# Patient Record
Sex: MALE | Race: WHITE | NOT HISPANIC OR LATINO | Employment: FULL TIME | URBAN - METROPOLITAN AREA
[De-identification: names, ages, dates, MRNs, and addresses within clinical notes are randomized per-mention and may not be internally consistent; named-entity substitution may affect disease eponyms.]

---

## 2020-04-03 ENCOUNTER — HOSPITAL ENCOUNTER (EMERGENCY)
Facility: HOSPITAL | Age: 40
Discharge: HOME | End: 2020-04-04

## 2020-04-03 DIAGNOSIS — M25.579 ANKLE PAIN: ICD-10-CM

## 2020-04-03 DIAGNOSIS — M25.569 KNEE PAIN: ICD-10-CM

## 2020-04-03 DIAGNOSIS — M79.606 LEG PAIN: ICD-10-CM

## 2020-04-03 PROCEDURE — 99284 EMERGENCY DEPT VISIT MOD MDM: CPT | Mod: 25

## 2020-04-04 VITALS
SYSTOLIC BLOOD PRESSURE: 133 MMHG | HEART RATE: 91 BPM | TEMPERATURE: 98 F | RESPIRATION RATE: 16 BRPM | BODY MASS INDEX: 27.56 KG/M2 | HEIGHT: 67 IN | WEIGHT: 175.63 LBS | OXYGEN SATURATION: 100 % | DIASTOLIC BLOOD PRESSURE: 74 MMHG

## 2020-04-04 NOTE — ED PROVIDER NOTES
SCRIBE #1 NOTE: I, Abeba Stallings, am scribing for, and in the presence of, Nando Hernández NP. I have scribed the entire note.       History     Chief Complaint   Patient presents with    Leg Pain     scrapped R leg on PVC pipe at work about 2 weeks ago; swelling, pain and redness to R leg     Review of patient's allergies indicates:  No Known Allergies      History of Present Illness     HPI    4/3/2020, 10:42 PM  History obtained from the patient      History of Present Illness: Jose Carlos Burton III is a 39 y.o. male patient who presents to the Emergency Department for evaluation of R leg pain which onset gradually 2 weeks PTA. Pt reports that 2 weeks PTA he scraped his R leg on a PVC pipe at work and his R knee has been hurting ever since. Symptoms are constant and moderate in severity. No mitigating or exacerbating factors reported. Pt noticed some R ankle swelling and pain 1 day PTA. Pt also c/o intermittent R calf pain. Patient denies any back pain, neck pain, fever, cough, congestion and all other sxs at this time. No prior Tx. No further complaints or concerns at this time.       Arrival mode: EMS      PCP: Primary Doctor No        Past Medical History:  History reviewed. No pertinent past medical history.       Past Surgical History:  History reviewed. No pertinent past surgical history.         Family History:  History reviewed. No pertinent family history.       Social History:  Social History     Tobacco Use    Smoking status: unknown   Substance and Sexual Activity    Alcohol use: unknown    Drug use: unknown    Sexual activity: unknown        Review of Systems     Review of Systems   Constitutional: Negative for fever.   HENT: Negative for congestion and sore throat.    Respiratory: Negative for cough and shortness of breath.    Cardiovascular: Negative for chest pain.   Gastrointestinal: Negative for nausea.   Genitourinary: Negative for dysuria.   Musculoskeletal: Negative for back pain and  neck pain.        (+) R leg pain  (+) R knee pain   (+) R ankle pain  (+) R calf pain   Skin: Negative for rash.   Neurological: Negative for weakness.   Hematological: Does not bruise/bleed easily.   All other systems reviewed and are negative.     Physical Exam     Initial Vitals [04/03/20 2240]   BP Pulse Resp Temp SpO2   (!) 148/80 97 20 98 °F (36.7 °C) 100 %      MAP       --          Physical Exam  Nursing Notes and Vital Signs Reviewed.  Constitutional: Patient is in no acute distress. Well-developed and well-nourished.  Head: Atraumatic. Normocephalic.  Eyes: PERRL. EOM intact. Conjunctivae are not pale. No scleral icterus.  ENT: Mucous membranes are moist. Oropharynx is clear and symmetric.    Neck: Supple. Full ROM. No lymphadenopathy.  Cardiovascular: Regular rate. Regular rhythm. No murmurs, rubs, or gallops. Distal pulses are 2+ and symmetric.  Pulmonary/Chest: No respiratory distress. Clear to auscultation bilaterally. No wheezing or rales.  Abdominal: Soft and non-distended.  There is no tenderness.  No rebound, guarding, or rigidity. Good bowel sounds.  Genitourinary: No CVA tenderness  Musculoskeletal: Moves all extremities. No obvious deformities. No edema. No calf tenderness.  RLE:  Mild swelling to medial R ankle. Calf and anterior knee tenderness to palpation. ROM is normal. Cap refill distally is <2 seconds. DP and PT pulses are equal and 2+ bilaterally. No motor deficit. No distal sensory deficit. RLE is neurovascularly intact.  Skin: Warm and dry.  Neurological:  Alert, awake, and appropriate.  Normal speech.  No acute focal neurological deficits are appreciated.  Psychiatric: Normal affect. Good eye contact. Appropriate in content.     ED Course   Splint Application  Date/Time: 4/4/2020 12:06 AM  Performed by: Nando Hernández NP  Authorized by: Nando Hernández NP   Consent Done: Yes  Consent: Verbal consent obtained. Written consent not obtained.  Consent given by: patient  Patient  "understanding: patient states understanding of the procedure being performed  Patient consent: the patient's understanding of the procedure matches consent given  Procedure consent: procedure consent matches procedure scheduled  Location details: right knee  Splint type: ace wrap.  Supplies used: ace wrap.  Post-procedure: The splinted body part was neurovascularly unchanged following the procedure.  Patient tolerance: Patient tolerated the procedure well with no immediate complications    Splint Application  Date/Time: 4/4/2020 12:07 AM  Performed by: Nando Hernández NP  Authorized by: Nando Hernández NP   Consent Done: Yes  Consent: Verbal consent obtained. Written consent not obtained.  Risks and benefits: risks, benefits and alternatives were discussed  Consent given by: patient  Patient understanding: patient states understanding of the procedure being performed  Patient consent: the patient's understanding of the procedure matches consent given  Procedure consent: procedure consent matches procedure scheduled  Patient identity confirmed: name  Location details: right ankle  Splint type: ace wrap.  Supplies used: elastic bandage  Post-procedure: The splinted body part was neurovascularly unchanged following the procedure.  Patient tolerance: Patient tolerated the procedure well with no immediate complications        ED Vital Signs:  Vitals:    04/03/20 2240 04/04/20 0035   BP: (!) 148/80 133/74   Pulse: 97 91   Resp: 20 16   Temp: 98 °F (36.7 °C) 97.7 °F (36.5 °C)   TempSrc: Oral Oral   SpO2: 100% 100%   Weight: 79.6 kg (175 lb 9.5 oz)    Height: 5' 7" (1.702 m)        Abnormal Lab Results:  Labs Reviewed - No data to display     All Lab Results:  None.    Imaging Results:  Imaging Results          X-Ray Ankle Complete Right (Final result)  Result time 04/04/20 08:38:00    Final result by Betzy Boykin MD (Timothy) (04/04/20 08:38:00)                 Impression:      Negative " exam.      Electronically signed by: Betzy Boykin MD  Date:    04/04/2020  Time:    08:38             Narrative:    EXAMINATION:  XR ANKLE COMPLETE 3 VIEW RIGHT    CLINICAL HISTORY:  Pain in unspecified ankle and joints of unspecified foot    TECHNIQUE:  Standard radiography performed.    COMPARISON:  None    FINDINGS:  Bone density and architecture are normal.  No acute findings.                               X-Ray Knee Complete 4 Or More Views Right (Final result)  Result time 04/04/20 08:14:50    Final result by Betzy Boykin MD (Timothy) (04/04/20 08:14:50)                 Impression:      Negative exam.      Electronically signed by: Betzy Boykin MD  Date:    04/04/2020  Time:    08:14             Narrative:    EXAMINATION:  XR KNEE COMP 4 OR MORE VIEWS RIGHT    CLINICAL HISTORY:  Pain in unspecified knee    TECHNIQUE:  Standard radiography performed.    COMPARISON:  None    FINDINGS:  Bone density and architecture are normal.  No acute findings.                               US Lower Extremity Veins Right (Final result)  Result time 04/03/20 23:00:23    Final result by Buster Wolf MD (04/03/20 23:00:23)                 Impression:      No evidence of deep venous thrombosis in the right lower extremity.      Electronically signed by: Buster Wolf  Date:    04/03/2020  Time:    23:00             Narrative:    EXAMINATION:  US LOWER EXTREMITY VEINS RIGHT    CLINICAL HISTORY:  Pain in leg, unspecified    TECHNIQUE:  Duplex and color flow Doppler evaluation and graded compression of the right lower extremity veins was performed.    COMPARISON:  None    FINDINGS:  Right thigh veins: The common femoral, femoral, popliteal, upper greater saphenous, and deep femoral veins are patent and free of thrombus. The veins are normally compressible and have normal phasic flow and augmentation response.    Right calf veins: The visualized calf veins are patent.    Contralateral CFV: The contralateral (left)  common femoral vein is patent and free of thrombus.    Miscellaneous: None                                          The Emergency Provider reviewed the vital signs and test results, which are outlined above.     ED Discussion       12:05 AM  I discussed with patient that the evaluation in the emergency department does not suggest any emergent or life threatening medical condition requiring immediate intervention beyond what was provided in the ED, and I believe patient is safe for discharge.  Regardless, an unremarkable evaluation in the ED does not preclude the development or presence of a serious of life threatening condition. As such, patient was instructed to return immediately for any worsening or change in current symptoms. I also discussed the results of my evaluation and diagnostics with patient and he concurs with the evaluation and management plan.  Detailed written and verbal instructions provided to patient and he expressed a verbal understanding of the discharge instructions and overall management plan. Reiterated the importance of medication administration and safety and advised patient to follow up with primary care provider in 3-5 days or sooner if needed.  Also advised patient to return to the ER for any complications.          Medical Decision Making:   Clinical Tests:   Radiological Study: Ordered and Reviewed           ED Medication(s):  Medications - No data to display    There are no discharge medications for this patient.      Follow-up Information     pcp of choice.                     Scribe Attestation:   Scribe #1: I performed the above scribed service and the documentation accurately describes the services I performed. I attest to the accuracy of the note.     Attending:   Physician Attestation Statement for Scribe #1: I, Nando Hernández NP, personally performed the services described in this documentation, as scribed by Abeba Stallings, in my presence, and it is both accurate and  complete.           Clinical Impression       ICD-10-CM ICD-9-CM   1. Knee pain M25.569 719.46   2. Ankle pain M25.579 719.47   3. Leg pain M79.606 729.5       Disposition:   Disposition: Discharged  Condition: Stable         Nando Hernández NP  04/04/20 2045

## 2021-07-11 ENCOUNTER — HOSPITAL ENCOUNTER (EMERGENCY)
Facility: HOSPITAL | Age: 41
Discharge: HOME | End: 2021-07-11
Attending: EMERGENCY MEDICINE

## 2021-07-11 VITALS
DIASTOLIC BLOOD PRESSURE: 86 MMHG | WEIGHT: 186.5 LBS | RESPIRATION RATE: 17 BRPM | TEMPERATURE: 98 F | HEART RATE: 88 BPM | BODY MASS INDEX: 29.27 KG/M2 | SYSTOLIC BLOOD PRESSURE: 138 MMHG | OXYGEN SATURATION: 99 % | HEIGHT: 67 IN

## 2021-07-11 DIAGNOSIS — H60.502 ACUTE OTITIS EXTERNA OF LEFT EAR, UNSPECIFIED TYPE: Primary | ICD-10-CM

## 2021-07-11 PROCEDURE — 99283 EMERGENCY DEPT VISIT LOW MDM: CPT

## 2021-07-11 RX ORDER — CIPROFLOXACIN AND DEXAMETHASONE 3; 1 MG/ML; MG/ML
4 SUSPENSION/ DROPS AURICULAR (OTIC) 2 TIMES DAILY
Qty: 7.5 ML | Refills: 0 | Status: SHIPPED | OUTPATIENT
Start: 2021-07-11